# Patient Record
Sex: FEMALE | Race: WHITE
[De-identification: names, ages, dates, MRNs, and addresses within clinical notes are randomized per-mention and may not be internally consistent; named-entity substitution may affect disease eponyms.]

---

## 2018-05-15 ENCOUNTER — HOSPITAL ENCOUNTER (INPATIENT)
Dept: HOSPITAL 17 - HSDC | Age: 38
LOS: 1 days | Discharge: HOME | DRG: 621 | End: 2018-05-16
Attending: SURGERY | Admitting: SURGERY
Payer: COMMERCIAL

## 2018-05-15 VITALS
DIASTOLIC BLOOD PRESSURE: 81 MMHG | RESPIRATION RATE: 18 BRPM | OXYGEN SATURATION: 97 % | HEART RATE: 67 BPM | TEMPERATURE: 97.1 F | SYSTOLIC BLOOD PRESSURE: 141 MMHG

## 2018-05-15 VITALS — WEIGHT: 238.32 LBS | BODY MASS INDEX: 39.71 KG/M2 | HEIGHT: 65 IN

## 2018-05-15 DIAGNOSIS — E78.5: ICD-10-CM

## 2018-05-15 DIAGNOSIS — E66.01: Primary | ICD-10-CM

## 2018-05-15 DIAGNOSIS — K76.0: ICD-10-CM

## 2018-05-15 DIAGNOSIS — I10: ICD-10-CM

## 2018-05-15 PROCEDURE — 83735 ASSAY OF MAGNESIUM: CPT

## 2018-05-15 PROCEDURE — 85025 COMPLETE CBC W/AUTO DIFF WBC: CPT

## 2018-05-15 PROCEDURE — 80048 BASIC METABOLIC PNL TOTAL CA: CPT

## 2018-05-15 PROCEDURE — 0D164ZA BYPASS STOMACH TO JEJUNUM, PERCUTANEOUS ENDOSCOPIC APPROACH: ICD-10-PCS | Performed by: SURGERY

## 2018-05-15 PROCEDURE — 94150 VITAL CAPACITY TEST: CPT

## 2018-05-15 RX ADMIN — SODIUM BICARBONATE SCH MLS/HR: 84 INJECTION, SOLUTION INTRAVENOUS at 23:05

## 2018-05-15 RX ADMIN — ACETAMINOPHEN SCH MLS/HR: 10 INJECTION, SOLUTION INTRAVENOUS at 18:20

## 2018-05-15 RX ADMIN — SODIUM CHLORIDE SCH MLS/HR: 900 INJECTION, SOLUTION INTRAVENOUS at 21:54

## 2018-05-15 RX ADMIN — ACETAMINOPHEN SCH MLS/HR: 10 INJECTION, SOLUTION INTRAVENOUS at 23:11

## 2018-05-15 RX ADMIN — SODIUM CHLORIDE SCH MG: 900 INJECTION, SOLUTION INTRAVENOUS at 16:15

## 2018-05-15 RX ADMIN — SODIUM BICARBONATE SCH MLS/HR: 84 INJECTION, SOLUTION INTRAVENOUS at 14:48

## 2018-05-15 RX ADMIN — SODIUM CHLORIDE SCH MG: 900 INJECTION, SOLUTION INTRAVENOUS at 21:58

## 2018-05-15 RX ADMIN — Medication SCH ML: at 21:00

## 2018-05-16 VITALS
DIASTOLIC BLOOD PRESSURE: 61 MMHG | HEART RATE: 87 BPM | SYSTOLIC BLOOD PRESSURE: 135 MMHG | RESPIRATION RATE: 15 BRPM | OXYGEN SATURATION: 99 % | TEMPERATURE: 97.5 F

## 2018-05-16 VITALS
SYSTOLIC BLOOD PRESSURE: 126 MMHG | DIASTOLIC BLOOD PRESSURE: 72 MMHG | OXYGEN SATURATION: 99 % | RESPIRATION RATE: 18 BRPM | HEART RATE: 77 BPM | TEMPERATURE: 97.6 F

## 2018-05-16 VITALS
DIASTOLIC BLOOD PRESSURE: 82 MMHG | RESPIRATION RATE: 15 BRPM | SYSTOLIC BLOOD PRESSURE: 127 MMHG | HEART RATE: 71 BPM | OXYGEN SATURATION: 96 % | TEMPERATURE: 97.8 F

## 2018-05-16 VITALS
SYSTOLIC BLOOD PRESSURE: 140 MMHG | RESPIRATION RATE: 16 BRPM | OXYGEN SATURATION: 97 % | DIASTOLIC BLOOD PRESSURE: 66 MMHG | HEART RATE: 80 BPM | TEMPERATURE: 97.7 F

## 2018-05-16 VITALS — OXYGEN SATURATION: 99 %

## 2018-05-16 LAB
BASOPHILS # BLD AUTO: 0 TH/MM3 (ref 0–0.2)
BASOPHILS NFR BLD: 0.2 % (ref 0–2)
BUN SERPL-MCNC: 9 MG/DL (ref 7–18)
CALCIUM SERPL-MCNC: 8.2 MG/DL (ref 8.5–10.1)
CHLORIDE SERPL-SCNC: 106 MEQ/L (ref 98–107)
CREAT SERPL-MCNC: 0.62 MG/DL (ref 0.5–1)
EOSINOPHIL # BLD: 0.1 TH/MM3 (ref 0–0.4)
EOSINOPHIL NFR BLD: 0.5 % (ref 0–4)
ERYTHROCYTE [DISTWIDTH] IN BLOOD BY AUTOMATED COUNT: 15.7 % (ref 11.6–17.2)
GFR SERPLBLD BASED ON 1.73 SQ M-ARVRAT: 107 ML/MIN (ref 89–?)
GLUCOSE SERPL-MCNC: 88 MG/DL (ref 74–106)
HCO3 BLD-SCNC: 22.7 MEQ/L (ref 21–32)
HCT VFR BLD CALC: 36.8 % (ref 35–46)
HGB BLD-MCNC: 12 GM/DL (ref 11.6–15.3)
LYMPHOCYTES # BLD AUTO: 1.9 TH/MM3 (ref 1–4.8)
LYMPHOCYTES NFR BLD AUTO: 17.1 % (ref 9–44)
MAGNESIUM SERPL-MCNC: 2.2 MG/DL (ref 1.5–2.5)
MCH RBC QN AUTO: 25.5 PG (ref 27–34)
MCHC RBC AUTO-ENTMCNC: 32.5 % (ref 32–36)
MCV RBC AUTO: 78.5 FL (ref 80–100)
MONOCYTE #: 0.7 TH/MM3 (ref 0–0.9)
MONOCYTES NFR BLD: 6.5 % (ref 0–8)
NEUTROPHILS # BLD AUTO: 8.3 TH/MM3 (ref 1.8–7.7)
NEUTROPHILS NFR BLD AUTO: 75.7 % (ref 16–70)
PLATELET # BLD: 319 TH/MM3 (ref 150–450)
PMV BLD AUTO: 8.4 FL (ref 7–11)
RBC # BLD AUTO: 4.69 MIL/MM3 (ref 4–5.3)
SODIUM SERPL-SCNC: 139 MEQ/L (ref 136–145)
WBC # BLD AUTO: 11 TH/MM3 (ref 4–11)

## 2018-05-16 RX ADMIN — SODIUM CHLORIDE SCH MG: 900 INJECTION, SOLUTION INTRAVENOUS at 10:10

## 2018-05-16 RX ADMIN — SODIUM BICARBONATE SCH MLS/HR: 84 INJECTION, SOLUTION INTRAVENOUS at 06:13

## 2018-05-16 RX ADMIN — SODIUM CHLORIDE SCH MLS/HR: 900 INJECTION, SOLUTION INTRAVENOUS at 12:02

## 2018-05-16 RX ADMIN — SODIUM BICARBONATE SCH MLS/HR: 84 INJECTION, SOLUTION INTRAVENOUS at 12:03

## 2018-05-16 RX ADMIN — ALBUTEROL SULFATE SCH MG: 2.5 SOLUTION RESPIRATORY (INHALATION) at 13:20

## 2018-05-16 RX ADMIN — ALBUTEROL SULFATE SCH MG: 2.5 SOLUTION RESPIRATORY (INHALATION) at 02:41

## 2018-05-16 RX ADMIN — SODIUM CHLORIDE SCH MG: 900 INJECTION, SOLUTION INTRAVENOUS at 03:36

## 2018-05-16 RX ADMIN — SODIUM CHLORIDE SCH MLS/HR: 900 INJECTION, SOLUTION INTRAVENOUS at 03:35

## 2018-05-16 RX ADMIN — Medication SCH ML: at 07:16

## 2018-05-16 RX ADMIN — ACETAMINOPHEN SCH MLS/HR: 10 INJECTION, SOLUTION INTRAVENOUS at 04:33

## 2018-05-16 RX ADMIN — ALBUTEROL SULFATE SCH MG: 2.5 SOLUTION RESPIRATORY (INHALATION) at 08:00

## 2018-05-16 RX ADMIN — ALBUTEROL SULFATE SCH MG: 2.5 SOLUTION RESPIRATORY (INHALATION) at 00:00

## 2018-05-16 RX ADMIN — ALBUTEROL SULFATE SCH MG: 2.5 SOLUTION RESPIRATORY (INHALATION) at 16:00

## 2018-05-16 RX ADMIN — ACETAMINOPHEN SCH MLS/HR: 10 INJECTION, SOLUTION INTRAVENOUS at 12:03

## 2018-05-16 NOTE — HHI.PR
__________________________________________________





Subjective


Subjective Notes


Pain well controlled


No GI complaints


Tolerating clears





Objective


Vitals/I&O





Vital Signs








  Date Time  Temp Pulse Resp B/P (MAP) Pulse Ox O2 Delivery O2 Flow Rate FiO2


 


5/16/18 08:00 97.5 87 15 135/61 (85) 99   


 


5/15/18 16:40      Room Air  


 


5/15/18 15:00       2 








Labs





Laboratory Tests








Test


  5/16/18


09:16


 


White Blood Count 11.0 


 


Red Blood Count 4.69 


 


Hemoglobin 12.0 


 


Hematocrit 36.8 


 


Mean Corpuscular Volume 78.5 


 


Mean Corpuscular Hemoglobin 25.5 


 


Mean Corpuscular Hemoglobin


Concent 32.5 


 


 


Red Cell Distribution Width 15.7 


 


Platelet Count 319 


 


Mean Platelet Volume 8.4 


 


Neutrophils (%) (Auto) 75.7 


 


Lymphocytes (%) (Auto) 17.1 


 


Monocytes (%) (Auto) 6.5 


 


Eosinophils (%) (Auto) 0.5 


 


Basophils (%) (Auto) 0.2 


 


Neutrophils # (Auto) 8.3 


 


Lymphocytes # (Auto) 1.9 


 


Monocytes # (Auto) 0.7 


 


Eosinophils # (Auto) 0.1 


 


Basophils # (Auto) 0.0 


 


CBC Comment DIFF FINAL 


 


Differential Comment  


 


Blood Urea Nitrogen 9 


 


Creatinine 0.62 


 


Random Glucose 88 


 


Calcium Level 8.2 


 


Magnesium Level 2.2 


 


Sodium Level 139 


 


Potassium Level 3.7 


 


Chloride Level 106 


 


Carbon Dioxide Level 22.7 


 


Anion Gap 10 


 


Estimat Glomerular Filtration


Rate 107 


 








Cardiovascular:  Regular


Lungs:  Clear


Abdomen:  Post-op tenderness


Extremities:  Perfused


Wound


Wound :  


   Wound Location:  Abdomen


   Appearance:  Clean & Dry





A/P


Assessment and Plan


37yo F POD#1 laparoscopic RNY





-D/C PCA, transition to oral pain control


-Restart home BP meds


-Continue to increase fluids as tolerated


-Continue with frequent ambulation


Discharge Planning


D/C home possibly [facility] Singer May 16, 2018 12:33

## 2018-05-20 NOTE — MP
cc:

Darío Hathaway MD

****

 

 

DATE OF OPERATION:

 

DATE OF OPERATION:

05/15/2018.

 

PREOPERATIVE DIAGNOSIS:

Morbid obesity with a BMI of 40, complicated by essential 

hypertension.

 

POSTOPERATIVE DIAGNOSIS:

Morbid obesity with a body mass index of 40, complicated by essential 

hypertension.

 

PROCEDURE PERFORMED:

Laparoscopic Sally-en-Y gastric bypass, 100 cm Sally limb, antegastric, 

antecolic.

 

SURGEON:

Darío Hathaway MD

 

ASSISTANT:

Jasiel Hensley MD

 

ANESTHESIA:

General endotracheal anesthesia.

 

ESTIMATED BLOOD LOSS:

Scant.

 

FINDINGS:

Fatty liver.

 

SPECIMENS:

None.

 

COMPLICATIONS:

None.

 

 

OPERATIVE PROCEDURE:

The patient was given prophylactic antibiotic in pre-op holding.  She 

was taken to the operating room and placed on the operating table in 

supine position.  Bilateral sequential inflation devices were placed 

on the lower extremities.  General anesthesia was instituted.  A Lord

catheter was placed.  The abdomen was prepped and draped in a sterile 

fashion.

 

The supraumbilical region was anesthetized with 0.5% Marcaine with 

epinephrine approximately three fingerbreadths above the umbilicus.  A

skin incision was made to the left of the umbilicus.  A 12 mm Optiview

port was placed under direct vision.  A pneumoperitoneum was created. 

Under direct vision one 5 mm right upper quadrant port, one 12 mm 

right upper quadrant port, as well as one 12 mm left upper quadrant 

and two 5 mm left upper quadrant ports were placed.  Prior to 

placement of all ports the skin and peritoneum was anesthetized with 

0.5% Marcaine with epinephrine.

 

The patient was placed in reverse Trendelenburg position.  A 

Yazmin-Flex retractor was placed and the liver was retracted.  The 

gastroesophageal junction was identified.  The angle of His was taken 

down.  The lesser sac was entered on the lesser curvature 

approximately 5 cm distal to the gastroesophageal junction.  The 

stomach was divided in this region with an Endo HENOK 3.5 mm load.  A 

gastric pouch was created angled towards the angle of His using an 

additional three firings of the Endo HENOK 3.5 mm load.  A 30-40 mL 

pouch was created.  The staple line of the gastric remnant was covered

with omentum.

 

Attention was then focused on the gastrocolic ligament, and this was 

opened widely using the Harmonic scalpel.  The transverse mesocolon 

was identified and was opened using the Harmonic scalpel creating a 

Y-defect.  The ligament of Treitz was identified and brought into 

view.  A point 50 cm distal to the ligament of Treitz was identified 

and the small bowel was transected in this region.  The distal segment

was run for a distance of 100 cm.  At this point using the 

biliopancreatic limb an enterostomy was created with an Endo HENOK 

vascular load.  This defect was closed with 2-0 Vicryl running in two 

layers.  The proximal staple line was then brought up into the upper 

abdomen.  An enterotomy was created.  A gastrotomy was created.  A 

gastrojejunostomy was created with an Endo HENOK 3.5 mm load creating a 

stoma of approximately 2 cm.  This defect was closed in two layers 

using 2-0 Vicryl.  Prior to placement of the second wave the 

anastomosis was tested with methylene blue.  There was leakage of 

methylene blue along the suture lines; this was reinforced with a 

second wave of running 2-0 Vicryl with no further leak.  Tisseel was 

then placed over the anastomosis.  A 10 mm flat [place] drain 

was then placed posterior to the gastrojejunal anastomosis as well as 

the jejunostomy.  The upper abdomen was irrigated with saline.

 

The liver retractor was then removed.  The pneumoperitoneum was 

released.  All ports were removed.  The ALLA was secured to the 

abdominal wall using 2-0 silk.  All skin incisions were closed with 

4-0 PDS.  The abdomen was cleaned.  A sterile dressing was placed.  

The patient was awakened and taken to the recovery room stable.

 

 

 

 

 

__________________________________

MD TYRA Green/IAN

D: 05/20/2018, 10:14 AM

T: 05/20/2018, 11:11 AM

Visit #: [id number]

Job #: [id number]